# Patient Record
Sex: FEMALE | Race: OTHER
[De-identification: names, ages, dates, MRNs, and addresses within clinical notes are randomized per-mention and may not be internally consistent; named-entity substitution may affect disease eponyms.]

---

## 2018-01-24 ENCOUNTER — HOSPITAL ENCOUNTER (INPATIENT)
Dept: HOSPITAL 72 - EMR | Age: 83
LOS: 1 days | Discharge: HOME | DRG: 149 | End: 2018-01-25
Payer: MEDICARE

## 2018-01-24 VITALS — WEIGHT: 140 LBS | HEIGHT: 66 IN | BODY MASS INDEX: 22.5 KG/M2

## 2018-01-24 VITALS — DIASTOLIC BLOOD PRESSURE: 62 MMHG | SYSTOLIC BLOOD PRESSURE: 143 MMHG

## 2018-01-24 VITALS — SYSTOLIC BLOOD PRESSURE: 146 MMHG | DIASTOLIC BLOOD PRESSURE: 47 MMHG

## 2018-01-24 VITALS — SYSTOLIC BLOOD PRESSURE: 127 MMHG | DIASTOLIC BLOOD PRESSURE: 43 MMHG

## 2018-01-24 DIAGNOSIS — M19.90: ICD-10-CM

## 2018-01-24 DIAGNOSIS — R42: Primary | ICD-10-CM

## 2018-01-24 DIAGNOSIS — I10: ICD-10-CM

## 2018-01-24 LAB
ADD MANUAL DIFF: NO
ALBUMIN SERPL-MCNC: 4.3 G/DL (ref 3.4–5)
ALBUMIN/GLOB SERPL: 1.2 {RATIO} (ref 1–2.7)
ALP SERPL-CCNC: 65 U/L (ref 46–116)
ALT SERPL-CCNC: 15 U/L (ref 12–78)
ANION GAP SERPL CALC-SCNC: 12 MMOL/L (ref 5–15)
AST SERPL-CCNC: 22 U/L (ref 15–37)
BASOPHILS NFR BLD AUTO: 1.2 % (ref 0–2)
BILIRUB SERPL-MCNC: 0.4 MG/DL (ref 0.2–1)
BUN SERPL-MCNC: 20 MG/DL (ref 7–18)
CALCIUM SERPL-MCNC: 9.4 MG/DL (ref 8.5–10.1)
CHLORIDE SERPL-SCNC: 105 MMOL/L (ref 98–107)
CK MB SERPL-MCNC: 1.8 NG/ML (ref 0–3.6)
CK SERPL-CCNC: 200 U/L (ref 26–308)
CO2 SERPL-SCNC: 24 MMOL/L (ref 21–32)
CREAT SERPL-MCNC: 1 MG/DL (ref 0.55–1.3)
EOSINOPHIL NFR BLD AUTO: 0.3 % (ref 0–3)
ERYTHROCYTE [DISTWIDTH] IN BLOOD BY AUTOMATED COUNT: 11.3 % (ref 11.6–14.8)
GLOBULIN SER-MCNC: 3.5 G/DL
HCT VFR BLD CALC: 42.5 % (ref 37–47)
HGB BLD-MCNC: 13.9 G/DL (ref 12–16)
LYMPHOCYTES NFR BLD AUTO: 18.1 % (ref 20–45)
MCV RBC AUTO: 92 FL (ref 80–99)
MONOCYTES NFR BLD AUTO: 2.7 % (ref 1–10)
NEUTROPHILS NFR BLD AUTO: 77.8 % (ref 45–75)
PLATELET # BLD: 218 K/UL (ref 150–450)
POTASSIUM SERPL-SCNC: 4.2 MMOL/L (ref 3.5–5.1)
RBC # BLD AUTO: 4.59 M/UL (ref 4.2–5.4)
SODIUM SERPL-SCNC: 141 MMOL/L (ref 136–145)
WBC # BLD AUTO: 7 K/UL (ref 4.8–10.8)

## 2018-01-24 PROCEDURE — 70553 MRI BRAIN STEM W/O & W/DYE: CPT

## 2018-01-24 PROCEDURE — 70543 MRI ORBT/FAC/NCK W/O &W/DYE: CPT

## 2018-01-24 PROCEDURE — 71045 X-RAY EXAM CHEST 1 VIEW: CPT

## 2018-01-24 PROCEDURE — 93005 ELECTROCARDIOGRAM TRACING: CPT

## 2018-01-24 PROCEDURE — 82553 CREATINE MB FRACTION: CPT

## 2018-01-24 PROCEDURE — 84443 ASSAY THYROID STIM HORMONE: CPT

## 2018-01-24 PROCEDURE — 80053 COMPREHEN METABOLIC PANEL: CPT

## 2018-01-24 PROCEDURE — 82550 ASSAY OF CK (CPK): CPT

## 2018-01-24 PROCEDURE — 84484 ASSAY OF TROPONIN QUANT: CPT

## 2018-01-24 PROCEDURE — 99285 EMERGENCY DEPT VISIT HI MDM: CPT

## 2018-01-24 PROCEDURE — 36415 COLL VENOUS BLD VENIPUNCTURE: CPT

## 2018-01-24 PROCEDURE — 70450 CT HEAD/BRAIN W/O DYE: CPT

## 2018-01-24 PROCEDURE — 85025 COMPLETE CBC W/AUTO DIFF WBC: CPT

## 2018-01-24 RX ADMIN — SODIUM CHLORIDE, SODIUM LACTATE, POTASSIUM CHLORIDE, AND CALCIUM CHLORIDE SCH MLS/HR: .6; .31; .03; .02 INJECTION, SOLUTION INTRAVENOUS at 19:24

## 2018-01-24 RX ADMIN — HEPARIN SODIUM SCH UNITS: 5000 INJECTION INTRAVENOUS; SUBCUTANEOUS at 21:14

## 2018-01-24 RX ADMIN — SODIUM CHLORIDE, SODIUM LACTATE, POTASSIUM CHLORIDE, AND CALCIUM CHLORIDE SCH MLS/HR: .6; .31; .03; .02 INJECTION, SOLUTION INTRAVENOUS at 19:23

## 2018-01-24 RX ADMIN — MECLIZINE HYDROCHLORIDE PRN MG: 25 TABLET ORAL at 21:14

## 2018-01-24 RX ADMIN — SODIUM CHLORIDE AND POTASSIUM CHLORIDE SCH MLS/HR: 4.5; 1.49 INJECTION, SOLUTION INTRAVENOUS at 21:14

## 2018-01-24 RX ADMIN — SODIUM CHLORIDE, SODIUM LACTATE, POTASSIUM CHLORIDE, AND CALCIUM CHLORIDE SCH MLS/HR: .6; .31; .03; .02 INJECTION, SOLUTION INTRAVENOUS at 15:07

## 2018-01-24 RX ADMIN — DOCUSATE SODIUM SCH MG: 100 CAPSULE, LIQUID FILLED ORAL at 21:00

## 2018-01-24 NOTE — DIAGNOSTIC IMAGING REPORT
Indications: Dizziness and vertigo, acute onset of severe dizziness and nausea since

this morning

 

Technique: Spiral acquisitions obtained through the brain. Angled axial and coronal 5

x 5 mm slices were reconstructed. Total dose length product 1390.16 mGycm.  CTDI

vol(s) 70.38 mGy. Dose reduction achieved using automated exposure control

 

Comparison: None

 

Findings: There is mild age-related enlargement of the ventricles and extra axial CSF

spaces. No acute intracranial hemorrhage or edema, mass effect, nor midline shift.

Normal gray-white differentiation. Intact calvarium. Visualized orbits and sinuses

are unremarkable

 

Impression: Mild age-related volume loss

 

Negative for acute intracranial bleed or mass effect

 

 

 

The CT scanner at [facility] is accredited by the [employer]

[employer] and the scans are performed using protocols designed to limit radiation

exposure to as low as reasonably achievable to attain images of sufficient resolution

adequate for diagnostic evaluation.

## 2018-01-24 NOTE — EMERGENCY ROOM REPORT
History of Present Illness


General


Chief Complaint:  Dizziness


Source:  Patient





Present Illness


HPI


82-year-old female with acute onset of severe dizziness and nausea that 

occurred since this morning.  No associated headache, neck pain or stiffness, 

fever or chills.  Patient states last time this happened was 8 years ago, had 

CT of her head done at that time which was negative.  Has not had any 

recurrences since.  Patient unable to open her eyes as light makes dizziness 

worse.  States closing her eyes makes dizziness better denies associated chest 

pain, shortness of breath, abdominal pain.  Denies  weakness to extremities.


Allergies:  


Coded Allergies:  


     No Known Allergies (Unverified , 1/24/18)





Patient History


Past Medical History:  none


Past Surgical History:  none


Pertinent Family History:  none


Social History:  Denies: smoking, alcohol use, drug use


Pregnant Now:  No


Immunizations:  UTD


Reviewed Nursing Documentation:  PMH: Agreed, PSxH: Agreed





Review of Systems


All Other Systems:  negative except mentioned in HPI





Physical Exam





Vital Signs








  Date Time  Temp Pulse Resp B/P (MAP) Pulse Ox O2 Delivery O2 Flow Rate FiO2


 


1/24/18 14:24 98.1 77 20 159/119 99 Room Air  








Sp02 EP Interpretation:  reviewed, normal


General Appearance:  normal inspection, well appearing, no apparent distress, 

alert, GCS 15, non-toxic, other - Eyes closed tight as light exacerbates 

dizziness


Head:  normocephalic, atraumatic


Eyes:  bilateral eye PERRL, bilateral eye EOMI


ENT:  normal ENT inspection, hearing grossly normal, normal pharynx, no 

angioedema, normal voice, TMs + canals normal, uvula midline, moist mucus 

membranes


Neck:  normal inspection, full range of motion, supple, thyroid normal, no 

meningismus, no bony tend


Respiratory:  normal inspection, lungs clear, normal breath sounds, no rhonchi, 

no respiratory distress, no retraction, no accessory muscle use, no wheezing, 

speaking full sentences


Cardiovascular #1:  regular rate, rhythm, no edema, no JVD, normal capillary 

refill


Gastrointestinal:  normal inspection, normal bowel sounds, non tender, soft, no 

mass, no peritonitis, non-distended, no guarding, no hernia, no pulsatile mass


Genitourinary:  no CVA tenderness


Musculoskeletal:  normal inspection, back normal, normal range of motion, no 

calf tenderness, pelvis stable, Cecy's Sign negative


Neurologic:  normal inspection, alert, oriented x3, responsive, CNs III-XII nml 

as tested, motor strength/tone normal, normal gait, speech normal, other - 

Cerebellar exam limited due to severe dizziness/vertigo


Psychiatric:  normal inspection, judgement/insight normal, mood/affect normal, 

no suicidal/homicidal ideation, no delusions


Skin:  normal inspection, normal color, no rash


Lymphatic:  normal inspection, no adenopathy





Medical Decision Making


Diagnostic Impression:  


 Primary Impression:  


 Dizziness


ER Course


vital signs stable, afebrile


Symptoms improved with Ativan, Zofran, meclizine


CT head negative for SAH, or infarct or mass to cerebellum


Labs: No leukocytosis, H&H stable, troponin 0


ECG nonischemic, no arrhythmia


Patient likely with BPPV


Will likely benefit from MRI while in patient however does not needed urgently


Endorsed to PMD Dr. Lee at 430 for tele admit


EKG Diagnostic Results


Rate:  normal


Rhythm:  NSR


ST Segments:  no acute changes


ASA given to the pt in ED:  No





Rhythm Strip Diag. Results


EP Interpretation:  yes


Rate:  72


Rhythm:  NSR, no PVC's, no ectopy





Chest X-Ray Diagnostic Results


Chest X-Ray Diagnostic Results :  


   Chest X-Ray Ordered:  Yes


   # of Views/Limited/Complete:  1 View


   Indication:  Other - dizzy


   EP Interpretation:  Yes


   Interpretation:  no consolidation, no effusion, no pneumothorax, no acute 

cardiopulmonary disease


   Impression:  No acute disease


   Electronically Signed by:  Dr Stephanie Upton MD





Last Vital Signs








  Date Time  Temp Pulse Resp B/P (MAP) Pulse Ox O2 Delivery O2 Flow Rate FiO2


 


1/24/18 14:24 98.1 77 20 159/119 99 Room Air  








Status:  improved


Disposition:  ADMITTED AS INPATIENT


Condition:  Serious











STEPHANIE UPTON M.D. Jan 24, 2018 14:43

## 2018-01-24 NOTE — HISTORY AND PHYSICAL REPORT
DATE OF ADMISSION:  01/24/2018



REASON FOR ADMISSION:  Vertigo.



HISTORY OF PRESENT ILLNESS:  This is an elderly female, who is admitted

because of symptoms of vertigo.  The patient presented to the emergency

room because of onset of symptoms approximately 07:30 this morning.  She

has had similar episodes on two prior occasions.  Usually, they resolved,

but at this time, they have been kind of persistent.  She has no blurred

vision or double vision.  No numbness, tingling, paralysis, weakness,

fevers, or chills.  No neck pain.  Her sensation worsens when she moves

around or she opens her eyes.  There has been no chest pain or pressure.

No PND.  No orthopnea.  No palpitation.  No lightheadedness.  No

palpitations.



PAST MEDICAL HISTORY:  Positive for high blood pressure.  Negative for

diabetes or high cholesterol.  No heart attack.  No cancer.  No stroke.

No hepatitis or tuberculosis.  No asthma or emphysema.  No ulcers.  No

kidney problems, liver problems, or thyroid problems.  No anemia.  She has

a history of arthritis.



MEDICATIONS:  She takes meclizine, Cozaar, as well as Norvasc.



ALLERGIES:  She has no known drug allergies.



SOCIAL HISTORY:  She never smoked and never drank alcoholic beverages.  No

drug use.



REVIEW OF SYSTEMS:  GASTROINTESTINAL:  Positive for nausea and vomiting

today associated with the symptoms that she presented with.  PULMONARY:

Negative.  CONSTITUTIONAL:  Negative.  NEUROLOGIC:  As mentioned in HPI.

CARDIAC:  As mentioned in HPI.



PHYSICAL EXAMINATION:

GENERAL:  Exam shows her to be elderly female, in no respiratory

distress.

HEENT:  Unremarkable.

NECK:  Supple.  No jugular venous distention.

LUNGS:  Clear to auscultation and percussion.

CARDIAC:  S1 is normal.  S2 is normal.  Regular rate and rhythm.  No

heaves, thrills, gallops, or rubs noted.

ABDOMEN:  Soft and nontender.  Positive bowel sounds.

EXTREMITIES:  There is no clubbing, cyanosis, or edema.

NEUROLOGIC:  She is awake, alert, and responsive.  She moves all four

extremities.  No nystagmus is noted.



LABORATORY AND DIAGNOSTIC DATA:  She did have a CT scan of her head that

was performed.  Negative for intracranial bleed or mass effect.  She had a

chest x-ray that was performed shows no acute processes, and

electrocardiogram shows normal sinus rhythm and normal QRS axis.  Blood

tests, white count of 7, hemoglobin 13.9, and platelet count of 218,000.

Sodium is 141, potassium 4.2, chloride 105, bicarb 24, BUN of 20,

creatinine 1.0, and glucose of 133.  Otherwise, troponins are negative.



ASSESSMENT:

1. Vertigo with history of the same.

2. Hypertension.



PLAN:  This patient is admitted to the hospital because of persistent

vertigo symptoms.  The patient _____ CT scan was negative.  MRI will be

performed tomorrow.  If she is any better tomorrow, she will be probably

discharged home with her back on her usual medications.  Her blood

pressure has been relatively controlled.  She has no other signs or

symptoms of neurological deficit.  She had no fevers and no chills to

suggest other etiologies of her vertigo.  The patient is admitted to my

service for insurance reasons.









  ______________________________________________

  Nito Fowler M.D.





DR:  JOSE MANUEL

D:  01/24/2018 19:13

T:  01/24/2018 21:55

JOB#:  [id number]

CC:

## 2018-01-24 NOTE — DIAGNOSTIC IMAGING REPORT
Indication: Shortness of breath

 

Technique: One view of the chest

 

Comparison: none

 

Findings: There is bilateral basilar atelectasis. The lungs and pleural spaces are

clear. The heart size is normal.

 

Impression: No acute process

## 2018-01-24 NOTE — CARDIOLOGY PROGRESS NOTE
Assessment/Plan


Assessment/Plan


[id number]





vertigo admitted to me due to insurance reasons 


mri in am homeif better





Objective





Last 24 Hour Vital Signs








  Date Time  Temp Pulse Resp B/P (MAP) Pulse Ox O2 Delivery O2 Flow Rate FiO2


 


1/24/18 19:01 97.9 75 18 143/62 97 Room Air  


 


1/24/18 16:30  74 17 127/43 97 Room Air  


 


1/24/18 14:30 97.1 88 27 146/47 99 Room Air  


 


1/24/18 14:24 98.1 77 20 159/119 99 Room Air  











Laboratory Tests








Test


  1/24/18


15:00


 


White Blood Count


  7.0 K/UL


(4.8-10.8)


 


Red Blood Count


  4.59 M/UL


(4.20-5.40)


 


Hemoglobin


  13.9 G/DL


(12.0-16.0)


 


Hematocrit


  42.5 %


(37.0-47.0)


 


Mean Corpuscular Volume 92 FL (80-99)  


 


Mean Corpuscular Hemoglobin


  30.3 PG


(27.0-31.0)


 


Mean Corpuscular Hemoglobin


Concent 32.8 G/DL


(32.0-36.0)


 


Red Cell Distribution Width


  11.3 %


(11.6-14.8)  L


 


Platelet Count


  218 K/UL


(150-450)


 


Mean Platelet Volume


  7.5 FL


(6.5-10.1)


 


Neutrophils (%) (Auto)


  77.8 %


(45.0-75.0)  H


 


Lymphocytes (%) (Auto)


  18.1 %


(20.0-45.0)  L


 


Monocytes (%) (Auto)


  2.7 %


(1.0-10.0)


 


Eosinophils (%) (Auto)


  0.3 %


(0.0-3.0)


 


Basophils (%) (Auto)


  1.2 %


(0.0-2.0)


 


Sodium Level


  141 MMOL/L


(136-145)


 


Potassium Level


  4.2 MMOL/L


(3.5-5.1)


 


Chloride Level


  105 MMOL/L


()


 


Carbon Dioxide Level


  24 MMOL/L


(21-32)


 


Anion Gap


  12 mmol/L


(5-15)


 


Blood Urea Nitrogen


  20 mg/dL


(7-18)  H


 


Creatinine


  1.0 MG/DL


(0.55-1.30)


 


Estimat Glomerular Filtration


Rate  mL/min (>60)  


 


 


Glucose Level


  133 MG/DL


()  H


 


Calcium Level


  9.4 MG/DL


(8.5-10.1)


 


Total Bilirubin


  0.4 MG/DL


(0.2-1.0)


 


Aspartate Amino Transf


(AST/SGOT) 22 U/L (15-37)


 


 


Alanine Aminotransferase


(ALT/SGPT) 15 U/L (12-78)


 


 


Alkaline Phosphatase


  65 U/L


()


 


Total Creatine Kinase


  200 U/L


()


 


Creatine Kinase MB


  1.8 NG/ML


(0.0-3.6)


 


Creatine Kinase MB Relative


Index 0.9  


 


 


Troponin I


  0.000 ng/mL


(0.000-0.056)


 


Total Protein


  7.8 G/DL


(6.4-8.2)


 


Albumin


  4.3 G/DL


(3.4-5.0)


 


Globulin 3.5 g/dL  


 


Albumin/Globulin Ratio 1.2 (1.0-2.7)  

















SHARRON GUTIERREZ Jan 24, 2018 19:13

## 2018-01-25 VITALS — DIASTOLIC BLOOD PRESSURE: 70 MMHG | SYSTOLIC BLOOD PRESSURE: 140 MMHG

## 2018-01-25 VITALS — DIASTOLIC BLOOD PRESSURE: 64 MMHG | SYSTOLIC BLOOD PRESSURE: 126 MMHG

## 2018-01-25 VITALS — SYSTOLIC BLOOD PRESSURE: 118 MMHG | DIASTOLIC BLOOD PRESSURE: 54 MMHG

## 2018-01-25 VITALS — SYSTOLIC BLOOD PRESSURE: 126 MMHG | DIASTOLIC BLOOD PRESSURE: 62 MMHG

## 2018-01-25 VITALS — DIASTOLIC BLOOD PRESSURE: 62 MMHG | SYSTOLIC BLOOD PRESSURE: 141 MMHG

## 2018-01-25 LAB
ADD MANUAL DIFF: NO
BASOPHILS NFR BLD AUTO: 1.4 % (ref 0–2)
EOSINOPHIL NFR BLD AUTO: 2 % (ref 0–3)
ERYTHROCYTE [DISTWIDTH] IN BLOOD BY AUTOMATED COUNT: 11.4 % (ref 11.6–14.8)
HCT VFR BLD CALC: 39.6 % (ref 37–47)
HGB BLD-MCNC: 13.4 G/DL (ref 12–16)
LYMPHOCYTES NFR BLD AUTO: 34.8 % (ref 20–45)
MCV RBC AUTO: 94 FL (ref 80–99)
MONOCYTES NFR BLD AUTO: 6 % (ref 1–10)
NEUTROPHILS NFR BLD AUTO: 55.9 % (ref 45–75)
PLATELET # BLD: 214 K/UL (ref 150–450)
RBC # BLD AUTO: 4.23 M/UL (ref 4.2–5.4)
WBC # BLD AUTO: 5 K/UL (ref 4.8–10.8)

## 2018-01-25 RX ADMIN — DOCUSATE SODIUM SCH MG: 100 CAPSULE, LIQUID FILLED ORAL at 08:35

## 2018-01-25 RX ADMIN — MECLIZINE HYDROCHLORIDE PRN MG: 25 TABLET ORAL at 10:12

## 2018-01-25 RX ADMIN — SODIUM CHLORIDE AND POTASSIUM CHLORIDE SCH MLS/HR: 4.5; 1.49 INJECTION, SOLUTION INTRAVENOUS at 16:41

## 2018-01-25 RX ADMIN — HEPARIN SODIUM SCH UNITS: 5000 INJECTION INTRAVENOUS; SUBCUTANEOUS at 08:35

## 2018-01-25 NOTE — DIAGNOSTIC IMAGING REPORT
Indication: Dizziness, vertigo

 

Technique: Thin slice axial and coronal T2 fast spin-echo, coronal T2 PROPELLER,

axial 3-D FIESTA, thin slice pre and postcontrast axial and coronal T1 fast spin echo

 

Comparison: none

 

Findings: Internal auditory canals, 7th and 8th cranial nerves are unremarkable,

without evidence of mass or unusual contrast enhancement. The bilateral mastoids are

unremarkable. The included brain stem, cerebellum, and supratentorial brain are

unremarkable. The vascular flow voids are preserved. There is mild cerebral and

cerebellar volume loss.

 

Impression: Negative

## 2018-01-25 NOTE — DIAGNOSTIC IMAGING REPORT
Indication: Dizziness and vertigo

 

Technique: sagittal T1 fast spin echo, axial T1 and T2 FLAIR PROPELLER, axial T2 FS

PROPELLER, T2* GRE, axial diffusion weighted images, post contrast axial and coronal

T1 FLAIR PROPELLER images. ADC and exponential ADC maps generated

 

Comparison: Reference made to brain CT 1/24/2018

 

Findings: . No abnormal areas of restricted diffusion to suggest acute infarction. No

acute hemorrhage or edema. No mass effect nor midline shift. No abnormal contrast

enhancement. There is mild age-related enlargement of the ventricles and extra-axial

CSF spaces. The vascular flow voids are preserved. Is evidence of prior bilateral

cataract surgery. The included sinuses are unremarkable.. .

 

Impression: Negative for acute intracranial bleed, mass effect, infarct, or contrast

enhancing lesion

 

Mild age-related changes

## 2018-01-26 NOTE — DISCHARGE SUMMARY
Discharge Summary


Hospital Course


Date of Admission


Jan 24, 2018 at 16:08


Date of Discharge


Jan 25, 2018 at 17:15


Admitting Diagnosis


DIZZY


HPI


Aurora Reyes is a 82 year old female who was admitted on Jan 24, 2018 at 16:08 

for DIZZY


Hospital Course


[id number]





Discharge


Discharge Disposition


Patient was discharged to Home (01)


Discharge Diagnoses:  











Pooja Alford NP Jan 26, 2018 10:21

## 2018-01-26 NOTE — DISCHARGE SUMMARY 2 SIG
DATE OF ADMISSION:  01/24/2018



DATE OF DISCHARGE:  01/25/2018



BRIEF HOSPITAL COURSE:  The patient is an 82-year-old female who presented

to ED for episodes off dizziness.  The patient had similar episodes on two

prior occasions usually they resolve, however, symptom was kind of

persistent.  There was no blurred vision or double vision.  No numbness,

tingling, paralysis, weakness, fever or chills.  No neck pain.  Her

sensation worsens when she moves around and she opens her eyes.  She

denies chest pain or chest pressure.  No PND.  No orthopnea.  No

palpitation.  No lightheadedness.  She has past medical history

significant for high blood pressure.  She has been on Cozaar as well as

Norvasc and takes meclizine at home.  On evaluation at ED, CT of the head

was negative for hemorrhage.  No infarct or mass.  She was given Ativan,

Zofran and meclizine.  EKG done showed normal sinus rhythm with no acute

changes and chest x-ray showed no consolidation, effusion, or

pneumothorax.  No acute cardiopulmonary disease.  She was admitted to

telemetry.  She was continued on her antihypertensives.  She had good

blood pressure control.  Troponin was negative.  Thyroid was normal.  She

had an MRI of the brain done that was negative for acute intracranial

bleed, mass effect, infarct, or contrast enhancing lesion.  She had MRI of

the face, orbit and neck, internal auditory canals, VII and VIII cranial

nerves were unremarkable.  She was given IV hydration.  Due to negative

workup, she was eventually discharged home to continue home medications

and to follow up with PMD.



FINAL DIAGNOSES:

1. Vertigo.

2. Hypertension.



DISPOSITION:  The patient was discharged home.



DISCHARGE MEDICATIONS:  Continue with amlodipine and Cozaar.  Increase

meclizine to 25 mg q.6 h. p.r.n. dizziness.



DISCHARGE INSTRUCTIONS:  Followup with PMD.







  ______________________________________________

  Nito Fowler M.D.



I have been assigned to dictate discharge summary on this account and I

was not involved in the patient's management.



  ______________________________________________

  Pooja Alford N.P.





DR:  LOREE

D:  01/26/2018 10:20

T:  01/26/2018 23:08

JOB#:  [id number]

CC:

## 2018-02-04 NOTE — CARDIOLOGY REPORT
--------------- APPROVED REPORT --------------





EKG Measurement

Heart [id number]

UT 

[id number]

[id number]

[id number]

[id number]





Normal sinus rhythm

Low voltage QRS

RBBB

Abnormal ECG